# Patient Record
(demographics unavailable — no encounter records)

---

## 2025-07-01 NOTE — PHYSICAL EXAM
[2+ biceps] : 2+ biceps [Triceps] : triceps [Ankle jerks] : ankle jerks [Well-appearing] : well-appearing [Normocephalic] : normocephalic [No dysmorphic facial features] : no dysmorphic facial features [No ocular abnormalities] : no ocular abnormalities [Soft] : soft [No deformities] : no deformities [Alert] : alert [Well related, good eye contact] : well related, good eye contact [Conversant] : conversant [Normal speech and language] : normal speech and language [Follows instructions well] : follows instructions well [VFF] : VFF [Pupils reactive to light and accommodation] : pupils reactive to light and accommodation [Full extraocular movements] : full extraocular movements [Saccadic and smooth pursuits intact] : saccadic and smooth pursuits intact [No nystagmus] : no nystagmus [Normal facial sensation to light touch] : normal facial sensation to light touch [No facial asymmetry or weakness] : no facial asymmetry or weakness [Gross hearing intact] : gross hearing intact [Equal palate elevation] : equal palate elevation [Good shoulder shrug] : good shoulder shrug [Normal tongue movement] : normal tongue movement [Midline tongue, no fasciculations] : midline tongue, no fasciculations [R handed] : R handed [Normal axial and appendicular muscle tone] : normal axial and appendicular muscle tone [Gets up on table without difficulty] : gets up on table without difficulty [No pronator drift] : no pronator drift [Normal finger tapping and fine finger movements] : normal finger tapping and fine finger movements [No abnormal involuntary movements] : no abnormal involuntary movements [5/5 strength in proximal and distal muscles of arms and legs] : 5/5 strength in proximal and distal muscles of arms and legs [Walks and runs well] : walks and runs well [Able to walk on heels] : able to walk on heels [Able to walk on toes] : able to walk on toes [Knee jerks] : knee jerks [No ankle clonus] : no ankle clonus [Bilaterally] : bilaterally [No dysmetria on FTNT] : no dysmetria on FTNT [Good walking balance] : good walking balance [Normal gait] : normal gait [Able to tandem well] : able to tandem well [Negative Romberg] : negative Romberg [de-identified] : Reports 80% of light touch decreased on the right V1-3 distribution, reports decrease vibration on the right V3 distribution compared to the right, intact temperature in all distribution, no red desaturation, VFF to finger counting, no nystagmus, skew deviation, or symptoms elicited on HINTs exam, mild tenderness on palpation of right occipital and right neck, no tenderness on palpation of the left shoulders, left occipital, temporal, frontal regions, or sinuses, no neck stiffness, unable to visualize optic discs on undilated eye exam due to lack of patient cooperation [de-identified] : normal respiratory effort, warm skin [de-identified] : faint red birthmarks on forehead and back of neck [de-identified] : no tremors while stretching arms out [de-identified] : 3+ patellar [de-identified] : LT, vibration, and temperature intact throughout all extremities (see above for trigeminal distribution of the face)

## 2025-07-01 NOTE — REVIEW OF SYSTEMS
[Normal] : Genitourinary [Eye Redness] : redness [Difficulty with Vision] : difficulty with vision [Headache] : headache [Birthmarks] : birthmarks [Depression] : depression [Anxiety] : anxiety [FreeTextEntry8] : twitching, tremors, paresthesia, weakness [de-identified] : red birthmarks on forehead and back of neck

## 2025-07-01 NOTE — HISTORY OF PRESENT ILLNESS
[FreeTextEntry1] : Magnolia is a 17 year old, RH, female with PMHx of asthma and seasonal allergies who presents to Neurology for their initial visit with concerns of headaches. Accompanied by mother today.  Per patient on 5/4/25, she had a headache and was crying, tearing up, she was scratching her right eye and feel something pop and feel like blurry vision in right eye only and her eye lid is heavy, but PCP thought the eye was not swollen. PCP referred to Ophthalmologist who prescribed glass for the right eye only. The next day her eye was red, and she began experiencing right eye numbness, right facial tingling and numbness only with headaches, when headaches stop paresthesia. Went to [facility] ED 5/29/25 with reportedly normal lab work and HCT. No records for any of the visits. For the past two weeks feel weaker on the right side.  Headaches first started around 8th grade in the setting of stress and allergies, would occur 2x/year can last up 2-3 days but resolved completely after a dose of Advil. Patient reports 12/2024 headaches worsening with increase in severity and increased in frequency (2x/month can last up 2-3 days) without any inciting events of trauma or infection.  She reports 5/10 headaches in the right temporal region with radiation occipital occurs 2x per past 2 months (no headaches June or July) and had to take Advil then headaches resolved after 2-3 days. Patient reports headaches are triggered by eye pain. Headaches can also trigger neck pain. Headaches waking patient up from sleep, blurry vision in the right eye, right sided weakness and paresthesias, photophobia, allergies Aura of right eye bottom twitching, lip twitching before headache. Denies autonomic (eye redness, tearing, sweating) symptoms, diplopia, flashing lights, zigzagging lines, changes in speech, LOC, positional changes, dizziness, lightheadedness, vertigo, emesis, nausea, phonophobia, tinnitus, muffled hearing, palpations Motrin and deep breathes makes headache better. Bright light, stress, and sleep deprivation makes headache worsen. Has missed 5 days of school due to headaches. Has tried Tylenol with minimal relief. Has tried Motrin with complete or partial relief (takes 2x/week). Denies history of meningitis, trauma, previous neurosurgical procedures. Family history of headache in mother  She states she will randomly get intermittently twitches calf, thigh, or forearm for 2 minutes, self-resolves, can occur up to 2-3x/day for 4x/week. She states that these twitches/tremor does not affect ability to write and occur randomly. Denies worsening by anxiety, stress, or caffeine intake. Denies fever, chill, URI symptoms, emesis, diarrhea, rash, sick contacts.   Hydration: 2-3 x 16 oz/day Caffeine: 1 bottle of soda 1x/week, denies coffee or tea Diet: denies skipping meal but picky eating (struggles with veggies) Sleeps at 9:30-10 PM, wakes up at 6:40 AM, napping for 1-2 hours started 11/2024, gasping, denies choking, snoring, or apneas, does sleeps through the night but feels tired in the morning Admits to chest pains intermittent when trying to sleep at night and asks mother to sleep with her. Reportedly had Cardiology evaluation was "normal - had muscle tightness around the heart" seen near [address] - no records available   Exercise: 15 minutes about 2-3x/week Life Stressors: school stressor, long time ago saw Psychiatrist and therapist for anxious - learning breathing techniques Mood: "okay but randomly will cry", admits to anxiety and depression; feels like nausea and shaking a lot when anxiety School: Finished in 12th grade, doing well, going to [employer] to study Biology, wants to be a PA Screen Time: 5 hours/day Eyes: last dilated funduscopic exam was a while ago ("when she was little"), in May 2025 was prescribed glass for the right eye only Work: not working Substance Use: denies alcohol or recreational drugs  Menarche: towards end of 8th grade, denies associated with headaches  Denies any neurosurgical procedure, autism, developmental delays, staring episodes, myoclonus, convulsions, stiffening, loss of tone, waking up with blood on pillow, unexplained myalgias, febrile seizures, history of previous post-ictal Nic's, status epilepticus or seizure clusters, family history of seizures, autism, or developmental delays. Denies fever, chill, URI symptoms, emesis, diarrhea, rash, sick contacts.   Birth History: 36 weeker, stayed in 1 week NICU stays for meconium with reported intubation, met developmental milestones, has red birthmarks on forehead and back of neck Developmental History: no concerns, denies PT, OT, Speech, or DEBORAH therapy Past Medical History: asthma - resolved Past Surgical History: right arm fracture surgical repair at 4 year old Medications: denies Allergies: seasonal Social History: Lives at home with parents, 1 sister - healthy, dog   Family History: headaches in mother Denies family history of seizures, developmental delays, autism, or other neurological disorders.

## 2025-07-01 NOTE — CONSULT LETTER
[Dear  ___] : Dear  [unfilled], [Please see my note below.] : Please see my note below. [Sincerely,] : Sincerely, [FreeTextEntry3] : Sheree Da Silva,  Attending Child Neurologist/Epileptologist

## 2025-07-01 NOTE — HISTORY OF PRESENT ILLNESS
[FreeTextEntry1] : Magnolia is a 17 year old, RH, female with PMHx of asthma and seasonal allergies who presents to Neurology for their initial visit with concerns of headaches. Accompanied by mother today.  Per patient on 5/4/25, she had a headache and was crying, tearing up, she was scratching her right eye and feel something pop and feel like blurry vision in right eye only and her eye lid is heavy, but PCP thought the eye was not swollen. PCP referred to Ophthalmologist who prescribed glass for the right eye only. The next day her eye was red, and she began experiencing right eye numbness, right facial tingling and numbness only with headaches, when headaches stop paresthesia. Went to [facility] ED 5/29/25 with reportedly normal lab work and HCT. No records for any of the visits. For the past two weeks feel weaker on the right side.  Headaches first started around 8th grade in the setting of stress and allergies, would occur 2x/year can last up 2-3 days but resolved completely after a dose of Advil. Patient reports 12/2024 headaches worsening with increase in severity and increased in frequency (2x/month can last up 2-3 days) without any inciting events of trauma or infection.  She reports 5/10 headaches in the right temporal region with radiation occipital occurs 2x per past 2 months (no headaches June or July) and had to take Advil then headaches resolved after 2-3 days. Patient reports headaches are triggered by eye pain. Headaches can also trigger neck pain. Headaches waking patient up from sleep, blurry vision in the right eye, right sided weakness and paresthesias, photophobia, allergies Aura of right eye bottom twitching, lip twitching before headache. Denies autonomic (eye redness, tearing, sweating) symptoms, diplopia, flashing lights, zigzagging lines, changes in speech, LOC, positional changes, dizziness, lightheadedness, vertigo, emesis, nausea, phonophobia, tinnitus, muffled hearing, palpations Motrin and deep breathes makes headache better. Bright light, stress, and sleep deprivation makes headache worsen. Has missed 5 days of school due to headaches. Has tried Tylenol with minimal relief. Has tried Motrin with complete or partial relief (takes 2x/week). Denies history of meningitis, trauma, previous neurosurgical procedures. Family history of headache in mother  She states she will randomly get intermittently twitches calf, thigh, or forearm for 2 minutes, self-resolves, can occur up to 2-3x/day for 4x/week. She states that these twitches/tremor does not affect ability to write and occur randomly. Denies worsening by anxiety, stress, or caffeine intake. Denies fever, chill, URI symptoms, emesis, diarrhea, rash, sick contacts.   Hydration: 2-3 x 16 oz/day Caffeine: 1 bottle of soda 1x/week, denies coffee or tea Diet: denies skipping meal but picky eating (struggles with veggies) Sleeps at 9:30-10 PM, wakes up at 6:40 AM, napping for 1-2 hours started 11/2024, gasping, denies choking, snoring, or apneas, does sleeps through the night but feels tired in the morning Admits to chest pains intermittent when trying to sleep at night and asks mother to sleep with her. Reportedly had Cardiology evaluation was "normal - had muscle tightness around the heart" seen near [address] - no records available   Exercise: 15 minutes about 2-3x/week Life Stressors: school stressor, long time ago saw Psychiatrist and therapist for anxious - learning breathing techniques Mood: "okay but randomly will cry", admits to anxiety and depression; feels like nausea and shaking a lot when anxiety School: Finished in 12th grade, doing well, going to [employer] to study Biology, wants to be a PA Screen Time: 5 hours/day Eyes: last dilated funduscopic exam was a while ago ("when she was little"), in May 2025 was prescribed glass for the right eye only Work: not working Substance Use: denies alcohol or recreational drugs  Menarche: towards end of 8th grade, denies associated with headaches  Denies any neurosurgical procedure, autism, developmental delays, staring episodes, myoclonus, convulsions, stiffening, loss of tone, waking up with blood on pillow, unexplained myalgias, febrile seizures, history of previous post-ictal Nic's, status epilepticus or seizure clusters, family history of seizures, autism, or developmental delays. Denies fever, chill, URI symptoms, emesis, diarrhea, rash, sick contacts.   Birth History: 36 weeker, stayed in 1 week NICU stays for meconium with reported intubation, met developmental milestones, has red birthmarks on forehead and back of neck Developmental History: no concerns, denies PT, OT, Speech, or DEBORAH therapy Past Medical History: asthma - resolved Past Surgical History: right arm fracture surgical repair at 4 year old Medications: denies Allergies: seasonal Social History: Lives at home with parents, 1 sister - healthy, dog   Family History: headaches in mother Denies family history of seizures, developmental delays, autism, or other neurological disorders.

## 2025-07-01 NOTE — PHYSICAL EXAM
[2+ biceps] : 2+ biceps [Triceps] : triceps [Ankle jerks] : ankle jerks [Well-appearing] : well-appearing [Normocephalic] : normocephalic [No dysmorphic facial features] : no dysmorphic facial features [No ocular abnormalities] : no ocular abnormalities [Soft] : soft [No deformities] : no deformities [Alert] : alert [Well related, good eye contact] : well related, good eye contact [Conversant] : conversant [Normal speech and language] : normal speech and language [Follows instructions well] : follows instructions well [VFF] : VFF [Pupils reactive to light and accommodation] : pupils reactive to light and accommodation [Full extraocular movements] : full extraocular movements [Saccadic and smooth pursuits intact] : saccadic and smooth pursuits intact [No nystagmus] : no nystagmus [Normal facial sensation to light touch] : normal facial sensation to light touch [No facial asymmetry or weakness] : no facial asymmetry or weakness [Gross hearing intact] : gross hearing intact [Equal palate elevation] : equal palate elevation [Good shoulder shrug] : good shoulder shrug [Normal tongue movement] : normal tongue movement [Midline tongue, no fasciculations] : midline tongue, no fasciculations [R handed] : R handed [Normal axial and appendicular muscle tone] : normal axial and appendicular muscle tone [Gets up on table without difficulty] : gets up on table without difficulty [No pronator drift] : no pronator drift [Normal finger tapping and fine finger movements] : normal finger tapping and fine finger movements [No abnormal involuntary movements] : no abnormal involuntary movements [5/5 strength in proximal and distal muscles of arms and legs] : 5/5 strength in proximal and distal muscles of arms and legs [Walks and runs well] : walks and runs well [Able to walk on heels] : able to walk on heels [Able to walk on toes] : able to walk on toes [Knee jerks] : knee jerks [No ankle clonus] : no ankle clonus [Bilaterally] : bilaterally [No dysmetria on FTNT] : no dysmetria on FTNT [Good walking balance] : good walking balance [Normal gait] : normal gait [Able to tandem well] : able to tandem well [Negative Romberg] : negative Romberg [de-identified] : Reports 80% of light touch decreased on the right V1-3 distribution, reports decrease vibration on the right V3 distribution compared to the right, intact temperature in all distribution, no red desaturation, VFF to finger counting, no nystagmus, skew deviation, or symptoms elicited on HINTs exam, mild tenderness on palpation of right occipital and right neck, no tenderness on palpation of the left shoulders, left occipital, temporal, frontal regions, or sinuses, no neck stiffness, unable to visualize optic discs on undilated eye exam due to lack of patient cooperation [de-identified] : normal respiratory effort, warm skin [de-identified] : faint red birthmarks on forehead and back of neck [de-identified] : no tremors while stretching arms out [de-identified] : 3+ patellar [de-identified] : LT, vibration, and temperature intact throughout all extremities (see above for trigeminal distribution of the face)

## 2025-07-01 NOTE — REASON FOR VISIT
[Initial Consultation] : an initial consultation for [Patient Declined  Services] : - None: Patient declined  services [Headache] : headache [Mother] : mother

## 2025-07-01 NOTE — PLAN
[FreeTextEntry1] : - Therapeutic Interventions: Recommend keeping a headache diary, the sleep schedule consistent, ensuring adequate hydration and maintaining a balanced diet. If needs abortives >2/week recommend taking Magnesium 200 mg BID (400 mg daily) and Vitamin B2 supplement 200 mg BID (400 mg daily) with food. - Counseled on not using abortives (Tylenol/Motrin) for no more than 2x/week. - Wearing glasses more consistently - Diagnostic Tests: As there are red flags will obtain MRI brain and neck w/wo. Patient is advised to schedule an appointment for ophthalmology to rule out any vision issues relating to headaches. Obtain TSH/T4, ferritin, vitamin B12, vitamin B1, vitamin D, EBV, and mycoplasma titers.  - Patient Education: Ensure the patient and parents understand the importance of hydration, sleep, and nutrition in managing recurring headaches. - Recommended psychiatrist and therapists to help with mood and stress management. Written psych, CBT, and biofeedback resources provided. - Follow up with Cardiologist for chest pain. - Patient is to fax and/or bring all previous records to next appointment. - Follow up in 1-2 months, or sooner if symptoms worsen.

## 2025-07-01 NOTE — REVIEW OF SYSTEMS
[Normal] : Genitourinary [Eye Redness] : redness [Difficulty with Vision] : difficulty with vision [Headache] : headache [Birthmarks] : birthmarks [Depression] : depression [Anxiety] : anxiety [FreeTextEntry8] : twitching, tremors, paresthesia, weakness [de-identified] : red birthmarks on forehead and back of neck

## 2025-07-01 NOTE — ASSESSMENT
[FreeTextEntry1] : Magnolia is a 17 year old, RH, female with PMHx of asthma and seasonal allergies who presents to Neurology for their initial visit with concerns of headaches, blurry vision, right facial paresthesia, muscle twitches, tremors, subjective right sided weakness, intermittent chest pain, anxiety, and depression. Patient has a multitude of complains with reportedly unremarkable Cardiology, Ophthalmologist workup (has glasses but does not wear them consistently - did not bring at office today), and HCT in ED no records available today. She has an inconsistent sensory exam with reports 80% of light touch decreased on the right V1-3 distribution, decrease vibration on the right V3 distribution compared to the right, but intact temperature in all distribution, mild tenderness on palpation of right occipital and right neck, otherwise is non-focal on neurological exam today without any red desaturation, VFF to finger counting, no nystagmus, skew deviation, or symptoms elicited on HINTs exam, obvious neurocutaneous stigmata, and meeting developmental milestones which is reassuring. However, there are red flags of headaches waking patient up from sleep, headaches worsening with increase in severity with increased in frequency, change in the characteristic of headaches, side locked with blurry vision in the right eye, right sided weakness and paresthesias, aura of right eye bottom twitching, lip twitching before headache will obtain brain and neck MRI to rule out structural lesions. Referred to Ophthalmologist for dilated fundoscopic exam to rule out vision issues leading to headaches. Wear glasses more consistently. Obtain TSH/T4, ferritin, vitamin B12, vitamin B1, vitamin D, EBV, and mycoplasma titers. Counseled on recommend lifestyle modifications and vitamin B2 and magnesium supplementation to help manage her headaches. Discussed pros and cons of additional headache medication and nerve blocks if needed. Written educational materials were provided. Counseled on not using abortives (Tylenol/Motrin) for no more than 2x/week. Recommended psychiatrist and therapists to help with mood and stress management. Written psych, CBT, and biofeedback resources provided. Follow up with Cardiologist for chest pain. Patient is to fax and/or bring all previous records to next appointment. Follow up in 1-2 months, or sooner if symptoms worsen.  I spent a total of 120 minutes on the date of the encounter chart reviewing, evaluating, coordination of care, counseling, and treating the patient.